# Patient Record
Sex: FEMALE | Race: WHITE | Employment: FULL TIME | URBAN - METROPOLITAN AREA
[De-identification: names, ages, dates, MRNs, and addresses within clinical notes are randomized per-mention and may not be internally consistent; named-entity substitution may affect disease eponyms.]

---

## 2017-04-18 ENCOUNTER — TELEPHONE (OUTPATIENT)
Dept: ENDOCRINOLOGY | Age: 32
End: 2017-04-18

## 2017-04-18 NOTE — TELEPHONE ENCOUNTER
Charmayne Barnes, PA at [facility], returned your call regarding this New Patient who is scheduled to see Dr. Lieutenant Kinney on May 3rd. Ms. John Mccain can be reached at:  [phone].

## 2017-04-18 NOTE — TELEPHONE ENCOUNTER
Spoke with Deya Abraham at [facility]. She wanted to know if the pt should be started on any thyroid replacement medication based on her labs. Pt is a 34 y/o female who presented with issues of fatigue. She has had multiple TSH levels in the 4-5's range and a TPO that was 386. I recommended she be started on LT4 50mcg daily. When asked about adding on  A \"free cortisol\" to her previously drawn labs I told her that a random cortisol does not have any clinical value, so don't.     Will forward this note to Dr. Riya Villalta who is scheduled to see her on 5/3/17

## 2017-04-21 ENCOUNTER — TELEPHONE (OUTPATIENT)
Dept: SLEEP MEDICINE | Age: 32
End: 2017-04-21

## 2017-04-21 NOTE — TELEPHONE ENCOUNTER
Called to schedule NP consult per Dr. Clara Orlando and patient stated she would call back to schedule if interested.

## 2017-05-02 LAB
DEPRECATED FTI SERPL-MCNC: 2.2 UG/DL
T3 UPTAKE,T3U: 21
THYROXINE (T-4), SERUM, 500455: 10.7
TSH SERPL DL<=0.005 MIU/L-ACNC: 4.72 M[IU]/L

## 2017-05-02 RX ORDER — LEVONORGESTREL AND ETHINYL ESTRADIOL 0.15-0.03
KIT ORAL
COMMUNITY

## 2017-05-02 RX ORDER — ERGOCALCIFEROL 1.25 MG/1
50000 CAPSULE ORAL
COMMUNITY

## 2017-05-03 ENCOUNTER — OFFICE VISIT (OUTPATIENT)
Dept: ENDOCRINOLOGY | Age: 32
End: 2017-05-03

## 2017-05-03 VITALS
HEART RATE: 78 BPM | BODY MASS INDEX: 40.31 KG/M2 | HEIGHT: 61 IN | DIASTOLIC BLOOD PRESSURE: 86 MMHG | WEIGHT: 213.5 LBS | SYSTOLIC BLOOD PRESSURE: 117 MMHG

## 2017-05-03 DIAGNOSIS — E03.8 HYPOTHYROIDISM DUE TO HASHIMOTO'S THYROIDITIS: Primary | ICD-10-CM

## 2017-05-03 DIAGNOSIS — E06.3 HYPOTHYROIDISM DUE TO HASHIMOTO'S THYROIDITIS: Primary | ICD-10-CM

## 2017-05-03 RX ORDER — LEVOTHYROXINE SODIUM 75 UG/1
75 TABLET ORAL
Qty: 70 TAB | Refills: 0 | Status: SHIPPED | OUTPATIENT
Start: 2017-05-03 | End: 2017-06-05 | Stop reason: SDUPTHER

## 2017-05-03 RX ORDER — LEVOTHYROXINE SODIUM 50 UG/1
TABLET ORAL
Refills: 2 | COMMUNITY
Start: 2017-04-18 | End: 2017-05-03 | Stop reason: SDUPTHER

## 2017-05-03 NOTE — PROGRESS NOTES
CONSULTATION REQUESTED BY: Lisa LUTZ    REASON FOR CONSULT: Hypothyroidism    CHIEF COMPLAINT: Evaluation for hypothyroidism    HISTORY OF PRESENT ILLNESS:   Manny Nugent is a 33 y.o. female with a PMHx as noted below who was referred to our endocrinology clinic for evaluation of hypothyroidism. Thyroid History:  Diagnosed in 2016 with borderline TSH and low T3 levels. Patient denies history of radiation exposure or trauma/surgery. Family history is not significant for thryoid disease  Currently taking levothyroxine 50mcg started by Dr. Eugene Brooks prior to initial appt by phone. The patient admits to taking it 1 hour before breakfast on an empty stomach. Recent Labs:  Lab Results   Component Value Date    TSH 4.720 2017   TPO level elevated at 386    Patient notes she was feeling quite a bit of fatigue. Focusing and concentrating has also been an issue. She notes that since starting generic levothyroxine she has been feeling raspy throat and needing ice at times. Overall, the patient is feeling well and reports her symptoms are half improved thus far.      PAST MEDICAL/SURGICAL HISTORY:   Past Medical History:   Diagnosis Date    Hypothyroidism     Mixed dyslipidemia     Other ill-defined conditions     anemia     Other ill-defined conditions     cyst on ovaries     Psychiatric disorder     depression    Psychiatric disorder     suicidal     Vitamin D deficiency      Past Surgical History:   Procedure Laterality Date    HX GYN          ALLERGIES:   Allergies   Allergen Reactions    Sulfa (Sulfonamide Antibiotics) Hives       MEDICATIONS ON ADMISSION:     Current Outpatient Prescriptions:     levothyroxine (SYNTHROID) 50 mcg tablet, TK 1 T PO D, Disp: , Rfl: 2    ergocalciferol (ERGOCALCIFEROL) 50,000 unit capsule, Take 50,000 Units by mouth., Disp: , Rfl:     levonorgestrel-ethinyl estradiol (LEVORA 0.15,) 0.15-0.03 mg tab, Take  by mouth., Disp: , Rfl:   VALERIAN ROOT PO, Take  by mouth., Disp: , Rfl:     naproxen (NAPROSYN) 500 mg tablet, Take 1 tablet by mouth every twelve (12) hours as needed for Pain., Disp: 20 tablet, Rfl: 0    HYDROcodone-acetaminophen (NORCO) 5-325 mg per tablet, Take 1 tablet by mouth every six (6) hours as needed for Pain., Disp: 12 tablet, Rfl: 0    sertraline (ZOLOFT) 50 mg tablet, Take 3 Tabs by mouth daily. , Disp: 42 Tab, Rfl: 1    SOCIAL HISTORY:   Social History     Social History    Marital status:      Spouse name: N/A    Number of children: N/A    Years of education: N/A     Occupational History    Not on file. Social History Main Topics    Smoking status: Former Smoker     Quit date: 12/27/2003    Smokeless tobacco: Not on file    Alcohol use No    Drug use: No    Sexual activity: Not on file     Other Topics Concern    Not on file     Social History Narrative       FAMILY HISTORY:  Family History   Problem Relation Age of Onset    Alcohol abuse Mother     Elevated Lipids Mother     Hypertension Mother     Arthritis-osteo Father     Lung Disease Maternal Grandmother        REVIEW OF SYSTEMS: Complete ROS assessed and noted for that which is described above, all else are negative.   Eyes: normal  ENT: normal  CVS: normal  Resp: normal  GI: normal  : normal  GYN: normal  Endocrine: mild fatigue  Integument: normal  Musculoskeletal: normal  Neuro: normal  Psych: normal      PHYSICAL EXAMINATION:    VITAL SIGNS:  Visit Vitals    /86 (BP 1 Location: Left arm, BP Patient Position: Sitting)    Pulse 78    Ht 5' 1\" (1.549 m)    Wt 213 lb 8 oz (96.8 kg)    BMI 40.34 kg/m2       GENERAL: NCAT, Sitting comfortably, NAD  EYES: EOMI, non-icteric, no proptosis  Ear/Nose/Throat: NCAT, no inflammation, no masses, thyroid gland is not appreciably enlarged  LYMPH NODES: No LAD  CARDIOVASCULAR: S1 S2, RRR, No murmur, 2+ radial pulses  RESPIRATORY: CTA b/l, no wheeze/rales  GASTROINTESTINAL: NT, ND  MUSCULOSKELETAL: Normal ROM, no atrophy  SKIN: warm, no edema/rash/ or other skin changes  NEUROLOGIC: 5/5 power all extremities, no tremor, AAOx3  PSYCHIATRIC: Normal affect, Normal insight and judgement       REVIEW OF LABORATORY AND RADIOLOGY DATA:   Labs and documentation have been reviewed as described above. ASSESSMENT AND PLAN:   Elsa Feng is a 33 y.o. female with a PMHx as noted above who was referred to our endocrinology clinic for evaluation of hypothyroidism. 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 We also discussed their current condition in the setting of this physiologic response. Today we will check a TSH & FT4 level to determine if patient is euthyroid on current regimen. Increase dose to 75 mcg of levothyroxine daily. Patient advised to take levothyroxine with a glass of water on an empty stomach each day in the mornings, 1 hour prior to ingesting any food or other medications, including vitamins. Discussed that if she misses a dose one day, to take two the following day, then return to once daily therafter     Plan to RTC in 2 months with meagan Hollingsworth.  [address] [facility]

## 2017-05-03 NOTE — PATIENT INSTRUCTIONS
Increase levothyroxine to 75 mcg daily,  Take levothyroxine with a glass of water on an empty stomach each day in the mornings, 1 hour prior to ingesting any food or other medications, including vitamins. Labs 2 days before next visit in 2 months,    Chikis BILL  [address] [facility] Endocr[facility]ology  [address] [place] [address]

## 2017-05-03 NOTE — MR AVS SNAPSHOT
Visit Information Date & Time Provider Department Dept. Phone Encounter #  
 5/3/2017  8:30 AM Rohith Valenzuela, [address] [place] [address] Diabetes and Endocrinology [phone] [id number] Follow-up Instructions Return in about 2 months (around 7/3/2017). Upcoming Health Maintenance Date Due DTaP/Tdap/Td series (1 - Tdap) 5/26/2004 PAP AKA CERVICAL CYTOLOGY 5/26/2004 INFLUENZA AGE 9 TO ADULT 8/1/2017 Allergies as of 5/3/2017  Review Complete On: 5/3/2017 By: Rohith Valenzuela MD  
  
 Severity Noted Reaction Type Reactions Sulfa (Sulfonamide Antibiotics)  12/27/2013    Hives Current Immunizations  Never Reviewed No immunizations on file. Not reviewed this visit You Were Diagnosed With   
  
 Codes Comments Hypothyroidism due to Hashimoto's thyroiditis    -  Primary ICD-10-CM: E03.8, E06.3 ICD-9-CM: 244.8, 245.2 Vitals BP Pulse Height(growth percentile) Weight(growth percentile) BMI OB Status 117/86 (BP 1 Location: Left arm, BP Patient Position: Sitting) 78 5' 1\" (1.549 m) 213 lb 8 oz (96.8 kg) 40.34 kg/m2 Having regular periods Smoking Status Former Smoker BMI and BSA Data Body Mass Index Body Surface Area  
 40.34 kg/m 2 2.04 m 2 Preferred Pharmacy Pharmacy Name Phone [facility] [address] [facility], [address] [place] [address] AT [address] [place] [address] [phone] Your Updated Medication List  
  
   
This list is accurate as of: 5/3/17  9:20 AM.  Always use your most recent med list.  
  
  
  
  
 ergocalciferol 50,000 unit capsule Commonly known as:  ERGOCALCIFEROL Take 50,000 Units by mouth. HYDROcodone-acetaminophen 5-325 mg per tablet Commonly known as:  Milinda Copier Take 1 tablet by mouth every six (6) hours as needed for Pain. LEVORA 0.15/30 (28) 0.15-0.03 mg Tab Generic drug:  levonorgestrel-ethinyl estradiol Take  by mouth. levothyroxine 75 mcg tablet Commonly known as:  SYNTHROID Take 1 Tab by mouth Daily (before breakfast). naproxen 500 mg tablet Commonly known as:  NAPROSYN Take 1 tablet by mouth every twelve (12) hours as needed for Pain. sertraline 50 mg tablet Commonly known as:  ZOLOFT Take 3 Tabs by mouth daily. VALERIAN ROOT PO Take  by mouth. Prescriptions Sent to Pharmacy Refills  
 levothyroxine (SYNTHROID) 75 mcg tablet 0 Sig: Take 1 Tab by mouth Daily (before breakfast). Class: Normal  
 Pharmacy: [facility] [address] [place] [address], [address] John Boggs  #: [phone] Route: Oral  
  
Follow-up Instructions Return in about 2 months (around 7/3/2017). To-Do List   
 06/26/2017 Lab:  T4, FREE   
  
 06/26/2017 Lab:  TSH 3RD GENERATION Patient Instructions Increase levothyroxine to 75 mcg daily, Take levothyroxine with a glass of water on an empty stomach each day in the mornings, 1 hour prior to ingesting any food or other medications, including vitamins. Labs 2 days before next visit in 2 months, 
 
Donell BILL [address] [facility] Endocr[facility]ology [facility] Introducing [facility]! Mary Kate Floyd introduces Quu patient portal. Now you can access parts of your medical record, email your doctor's office, and request medication refills online. 1. In your internet browser, go to https://[employer]) 2. Click on the First Time User? Click Here link in the Sign In box. You will see the New Member Sign Up page. 3. Enter your [employer] Access Code exactly as it appears below. You will not need to use this code after youve completed the sign-up process. If you do not sign up before the expiration date, you must request a new code. · [employer] Access Code: [email or web address] Expires: 8/1/2017  9:17 AM 
 
 4. Enter the last four digits of your Social Security Number (xxxx) and Date of Birth (mm/dd/yyyy) as indicated and click Submit. You will be taken to the next sign-up page. 5. Create a [employer] ID. This will be your [employer] login ID and cannot be changed, so think of one that is secure and easy to remember. 6. Create a [employer] password. You can change your password at any time. 7. Enter your Password Reset Question and Answer. This can be used at a later time if you forget your password. 8. Enter your e-mail address. You will receive e-mail notification when new information is available in [address] 9. Click Sign Up. You can now view and download portions of your medical record. 10. Click the Download Summary menu link to download a portable copy of your medical information. If you have questions, please visit the Frequently Asked Questions section of the [employer] website. Remember, [employer] is NOT to be used for urgent needs. For medical emergencies, dial 911. Now available from your iPhone and Android! Please provide this summary of care documentation to your next provider. Your primary care clinician is listed as [address]. If you have any questions after today's visit, please call [phone].

## 2017-06-05 RX ORDER — LEVOTHYROXINE SODIUM 75 UG/1
75 TABLET ORAL
Qty: 30 TAB | Refills: 0 | Status: SHIPPED | OUTPATIENT
Start: 2017-06-05 | End: 2017-07-19 | Stop reason: SDUPTHER

## 2017-06-12 ENCOUNTER — OFFICE VISIT (OUTPATIENT)
Dept: SURGERY | Age: 32
End: 2017-06-12

## 2017-06-12 ENCOUNTER — TELEPHONE (OUTPATIENT)
Dept: SURGERY | Age: 32
End: 2017-06-12

## 2017-06-12 VITALS
BODY MASS INDEX: 40.57 KG/M2 | OXYGEN SATURATION: 100 % | HEIGHT: 61 IN | DIASTOLIC BLOOD PRESSURE: 64 MMHG | SYSTOLIC BLOOD PRESSURE: 114 MMHG | HEART RATE: 87 BPM | WEIGHT: 214.9 LBS

## 2017-06-12 DIAGNOSIS — L02.91 ABSCESS: Primary | ICD-10-CM

## 2017-06-12 RX ORDER — LIDOCAINE HYDROCHLORIDE 10 MG/ML
10 INJECTION, SOLUTION EPIDURAL; INFILTRATION; INTRACAUDAL; PERINEURAL ONCE
Qty: 10 ML | Refills: 0
Start: 2017-06-12 | End: 2017-06-12

## 2017-06-12 RX ORDER — AMOXICILLIN 875 MG/1
TABLET, FILM COATED ORAL
COMMUNITY
Start: 2017-05-28 | End: 2017-06-26

## 2017-06-12 RX ORDER — DOXYCYCLINE 100 MG/1
100 CAPSULE ORAL 2 TIMES DAILY
Qty: 14 CAP | Refills: 0 | Status: SHIPPED | OUTPATIENT
Start: 2017-06-12 | End: 2017-06-12 | Stop reason: SINTOL

## 2017-06-12 RX ORDER — HYDROCODONE BITARTRATE AND ACETAMINOPHEN 5; 325 MG/1; MG/1
1 TABLET ORAL
Qty: 20 TAB | Refills: 0 | Status: SHIPPED | OUTPATIENT
Start: 2017-06-12 | End: 2017-06-26

## 2017-06-12 RX ORDER — TETRACYCLINE HYDROCHLORIDE 500 MG/1
500 CAPSULE ORAL
Qty: 14 CAP | Refills: 0 | Status: SHIPPED | OUTPATIENT
Start: 2017-06-12 | End: 2017-06-19

## 2017-06-12 RX ORDER — TETRACYCLINE HYDROCHLORIDE 500 MG/1
CAPSULE ORAL
COMMUNITY
Start: 2017-06-07 | End: 2017-06-12 | Stop reason: SDUPTHER

## 2017-06-12 NOTE — TELEPHONE ENCOUNTER
Patient states the antibiotic prescribed for her is the only one that she is allergic to. She would like for you to call in another one, and please call to let her know when you have done so.

## 2017-06-12 NOTE — PROGRESS NOTES
HISTORY OF PRESENT ILLNESS  Radha Bruce is a 27 y.o. female. HPI Comments:   Right axilla    Was on amox  Now on tetracycline    No drainage  Still painful        ____________________________________________________________________________  Patient presents with:  Skin Problem: Blessing in Rt armpit. Pt seen at the request of Dr. Kj Benton for evaluation of blessing    /64 (BP 1 Location: Left arm, BP Patient Position: Sitting)  Pulse 87  Ht 5' 1\" (1.549 m)  Wt 97.5 kg (214 lb 14.4 oz)  SpO2 100%  BMI 40.6 kg/m2  Past Medical History:  No date: High cholesterol  No date: Hypothyroidism  No date: Mixed dyslipidemia  No date: Other ill-defined conditions      Comment: anemia   No date: Other ill-defined conditions      Comment: cyst on ovaries   No date: Psychiatric disorder      Comment: depression  No date: Psychiatric disorder      Comment: suicidal   No date: Vitamin D deficiency  Past Surgical History:   : HX GYN  Social History    Marital status:              Spouse name:                       Years of education:                 Number of children:               Social History Main Topics    Smoking status: Former Smoker                                                                Packs/day: 0.00      Years: 0.00           Quit date: 2003    Alcohol use: No              Drug use: No              Review of patient's family history indicates:    Alcohol abuse                  Mother                    Elevated Lipids                Mother                    Hypertension                   Mother                    Arthritis-osteo                Father                    Lung Disease                   Maternal Grandmother      Current Outpatient Prescriptions:  levothyroxine (SYNTHROID) 75 mcg tablet, Take 1 Tab by mouth Daily (before breakfast).   ergocalciferol (ERGOCALCIFEROL) 50,000 unit capsule, Take 50,000 Units by mouth.  levonorgestrel-ethinyl estradiol (LEVORA 0.15/30, 28,) 0.15-0.03 mg tab, Take  by mouth. VALERIAN ROOT PO, Take  by mouth. sertraline (ZOLOFT) 50 mg tablet, Take 3 Tabs by mouth daily. amoxicillin (AMOXIL) 875 mg tablet,   tetracycline (ACHROMYCIN, SUMYCIN) 500 mg capsule,   naproxen (NAPROSYN) 500 mg tablet, Take 1 tablet by mouth every twelve (12) hours as needed for Pain. HYDROcodone-acetaminophen (NORCO) 5-325 mg per tablet, Take 1 tablet by mouth every six (6) hours as needed for Pain. No current facility-administered medications for this visit. Allergies:  -- Sulfa (Sulfonamide Antibiotics) -- Hives  _____________________________________________________________________________      Skin Problem   The history is provided by the patient. This is a recurrent problem. The current episode started more than 1 week ago. The problem occurs constantly. The problem has been gradually improving. Pertinent negatives include no chest pain, no abdominal pain, no headaches and no shortness of breath. The symptoms are aggravated by bending and twisting. The symptoms are relieved by rest. The treatment provided mild relief. Review of Systems   Constitutional: Negative for chills, fever and weight loss. HENT: Negative for ear pain. Eyes: Negative for pain. Respiratory: Negative for shortness of breath. Cardiovascular: Negative for chest pain. Gastrointestinal: Negative for abdominal pain and blood in stool. Genitourinary: Negative for hematuria. Musculoskeletal: Negative for joint pain. Skin: Negative for rash. Neurological: Negative for dizziness, focal weakness, seizures and headaches. Endo/Heme/Allergies: Does not bruise/bleed easily. Psychiatric/Behavioral: The patient does not have insomnia. Physical Exam   Constitutional: She is oriented to person, place, and time. She appears well-developed and well-nourished. No distress. HENT:   Head: Normocephalic and atraumatic. Mouth/Throat: No oropharyngeal exudate.    Eyes: Pupils are equal, round, and reactive to light. Neck: Normal range of motion. No tracheal deviation present. Cardiovascular: Normal rate, regular rhythm and normal heart sounds. No murmur heard. Pulmonary/Chest: Effort normal and breath sounds normal. No respiratory distress. She has no wheezes. Abdominal: Soft. Bowel sounds are normal. She exhibits no distension and no mass. There is no tenderness. There is no rebound and no guarding. Musculoskeletal: Normal range of motion. She exhibits no edema or tenderness. Lymphadenopathy:     She has no cervical adenopathy. Neurological: She is alert and oriented to person, place, and time. Skin: Skin is warm. No rash noted. She is not diaphoretic. No erythema. Psychiatric: She has a normal mood and affect. Her behavior is normal.       ASSESSMENT and PLAN    ICD-10-CM ICD-9-CM    1. Abscess L02.91 682.9        I had an extensive discussion with Harvey Cortez regarding the risks, benefits, and alternatives of proceeding with a incision and drainage of this axillary abscess. Risks of surgery including the risk of anesthesia, bleeding, infection, injury to underlying structures, recurrence, need for repeat or more extensive procedures, and the lack of symptomatic improvement were discussed and she is in agreement to proceed. Rx management:  Orders Placed This Encounter            doxycycline (MONODOX) 100 mg capsule     Sig: Take 1 Cap by mouth two (2) times a day for 7 days. Dispense:  14 Cap     Refill:  0    HYDROcodone-acetaminophen (NORCO) 5-325 mg per tablet     Sig: Take 1 Tab by mouth every four (4) hours as needed for Pain. Max Daily Amount: 6 Tabs. Dispense:  20 Tab     Refill:  0       She will complete her course of antibiotics. Wound care instructions were reviewed and provided in writing. F/u two weeks. Thank you for this consult. Procedure:   Incision and drainage of complex abscess of the right axilla      Procedure Details: The risks, benefits, and alternatives were explained and consent was obtained for the procedure. The area was sterile prepped and draped in the usual manner. 1% lidocaine with epinephrine was infiltrated into the skin overlying the abscess. An incision was made. A Large amount of pus was obtained. A culture was obtained. The loculations and crypts within the wound were broken up with a hemostat. The wound was packed with iodoform gauze. A sterile dressing was then applied. The patient tolerated the procedure well. Wound care instructions were given.

## 2017-06-12 NOTE — PROGRESS NOTES
[facility]  OFFICE PROCEDURE PROGRESS NOTE        Chart reviewed for the following:   ISindy LPN, have reviewed the History, Physical and updated the Allergic reactions for [address] performed immediately prior to start of procedure:   Vineet Byrd LPN, have performed the following reviews on Sukhdeep Hernandez prior to the start of the procedure:            * Patient was identified by name and date of birth   * Agreement on procedure being performed was verified  * Risks and Benefits explained to the patient  * Procedure site verified and marked as necessary  * Patient was positioned for comfort  * Consent was signed and verified     Time: 4112      Date of procedure: 6/12/2017    Procedure performed by:  Sree Morel MD    Provider assisted by: CAMMIE Love LPN    Patient assisted by: self    How tolerated by patient: Pt tolerated procedure well.     Post Procedural Pain Scale: 0/10    Comments: None

## 2017-06-12 NOTE — TELEPHONE ENCOUNTER
She told me she is allergic to Sulfa drugs. Calling to say she is also allergic to sulfites. Will cont the tetracycline for now pending the culture.

## 2017-06-12 NOTE — PATIENT INSTRUCTIONS
**Wound Care:    · Replace outer gauze with new dry gauze twice a day starting today. · Starting Tomorrow, start pulling out the packing 1\" a day:  · Pull the packing 1\" and trim the excess, leave a long tail so you don't loose the packing in the wound.   · Continue to change the outer gauze twice a day  · Pull the packing daily until the packing is completely out

## 2017-06-12 NOTE — MR AVS SNAPSHOT
Visit Information Date & Time Provider Department Dept. Phone Encounter #  
 6/12/2017  2:10 PM Ford Vann MD [facility] [place] [address] [id number] Your Appointments 6/26/2017  3:00 PM  
POST OP 10 MIN with Ford Vann MD  
[facility] Dr. Ravinder Nieves ([facility]) Appt Note: post/op I&D of right axilla abscess on 6/12/17. 2 week FU  
 [address] [facility], [address] [facility], [address] [address]  
[address] [facility], [address] [address]  
  
    
 7/19/2017  9:30 AM  
Follow Up with Viola Durand MD  
[place] [facility]) Appt Note: 2 month f/u  
 [address] [phone] [address] [facility] Upcoming Health Maintenance Date Due DTaP/Tdap/Td series (1 - Tdap) 5/26/2004 PAP AKA CERVICAL CYTOLOGY 5/26/2004 INFLUENZA AGE 9 TO ADULT 8/1/2017 Allergies as of 6/12/2017  Review Complete On: 6/12/2017 By: Ford Vann MD  
  
 Severity Noted Reaction Type Reactions Sulfa (Sulfonamide Antibiotics)  12/27/2013    Hives Current Immunizations  Never Reviewed No immunizations on file. Not reviewed this visit You Were Diagnosed With   
  
 Codes Comments Abscess    -  Primary ICD-10-CM: L02.91 
ICD-9-CM: 192. 9 Vitals BP Pulse Height(growth percentile) Weight(growth percentile) SpO2 BMI  
 114/64 (BP 1 Location: Left arm, BP Patient Position: Sitting) 87 5' 1\" (1.549 m) 214 lb 14.4 oz (97.5 kg) 100% 40.6 kg/m2 OB Status Smoking Status Having regular periods Former Smoker Vitals History BMI and BSA Data Body Mass Index Body Surface Area  
 40.6 kg/m 2 2.05 m 2 Preferred Pharmacy Pharmacy Name Phone [facility] [address] [facility], [address]  [facility], [address] [phone] Your Updated Medication List  
  
   
This list is accurate as of: 6/12/17  4:04 PM.  Always use your most recent med list.  
  
  
  
  
 amoxicillin 875 mg tablet Commonly known as:  AMOXIL  
  
 doxycycline 100 mg capsule Commonly known as:  Justina Riles Take 1 Cap by mouth two (2) times a day for 7 days. ergocalciferol 50,000 unit capsule Commonly known as:  ERGOCALCIFEROL Take 50,000 Units by mouth. HYDROcodone-acetaminophen 5-325 mg per tablet Commonly known as:  [facility] Take 1 Tab by mouth every four (4) hours as needed for Pain. Max Daily Amount: 6 Tabs. LEVORA 0.15/30 (28) 0.15-0.03 mg Tab Generic drug:  levonorgestrel-ethinyl estradiol Take  by mouth.  
  
 levothyroxine 75 mcg tablet Commonly known as:  SYNTHROID Take 1 Tab by mouth Daily (before breakfast). lidocaine (PF) 10 mg/mL (1 %) injection Commonly known as:  XYLOCAINE 10 mL by IntraVENous route once for 1 dose. Indications: lot # -DK. Exp date 11/1/18  
  
 naproxen 500 mg tablet Commonly known as:  NAPROSYN Take 1 tablet by mouth every twelve (12) hours as needed for Pain. sertraline 50 mg tablet Commonly known as:  ZOLOFT Take 3 Tabs by mouth daily. tetracycline 500 mg capsule Commonly known as:  ACHROMYCIN, SUMYCIN  
  
 VALERIAN ROOT PO Take  by mouth. Prescriptions Printed Refills HYDROcodone-acetaminophen (NORCO) 5-325 mg per tablet 0 Sig: Take 1 Tab by mouth every four (4) hours as needed for Pain. Max Daily Amount: 6 Tabs. Class: Print Route: Oral  
  
Prescriptions Sent to Pharmacy Refills  
 doxycycline (MONODOX) 100 mg capsule 0 Sig: Take 1 Cap by mouth two (2) times a day for 7 days.   
 Class: Normal  
 Pharmacy: [facility] [id number] [facility] [address], [address] [place] [address] 24 [facility] of Jonh Mahajan Ph #: [phone] Route: Oral  
  
We Performed the Following AEROBIC BACTERIAL CULTURE T4023214 CPT(R)] Patient Instructions **Wound Care: · Replace outer gauze with new dry gauze twice a day starting today. · Starting Tomorrow, start pulling out the packing 1\" a day: 
· Pull the packing 1\" and trim the excess, leave a long tail so you don't loose the packing in the wound. · Continue to change the outer gauze twice a day · Pull the packing daily until the packing is completely out Introducing [facility] SERVICES! Dear Dolly Pal: Thank you for requesting a Crowdsourcing.[email or web address] account. Our records indicate that you already have an active [email or web address] account. You can access your account anytime at [email or web address]. [email or web address] [email or web address] Did you know that you can access your hospital and ER discharge instructions at any time in [email or web address]? You can also review all of your test results from your hospital stay or ER visit. Additional Information If you have questions, please visit the Frequently Asked Questions section of the [email or web address] website at [email or web address] Remember, [email or web address] is NOT to be used for urgent needs. For medical emergencies, dial 911. Now available from your iPhone and Android! Please provide this summary of care documentation to your next provider. Your primary care clinician is listed as Keyana Casper. If you have any questions after today's visit, please call [phone].

## 2017-06-16 ENCOUNTER — TELEPHONE (OUTPATIENT)
Dept: SURGERY | Age: 32
End: 2017-06-16

## 2017-06-16 NOTE — TELEPHONE ENCOUNTER
Pt had surg Mon on a abcesspacking has come out and [employer] has come out. Please call Pt on [phone].

## 2017-06-16 NOTE — TELEPHONE ENCOUNTER
Packing out,right axilla  wound draining small amount pinkish drainage, not inflamed around site. Pt afebrile. C/o metalic taste in her mouth. Informed pt antibiotics can cause  a bad taste;no c/o rash, swelling. Drink plenty of liquids, complete all antibiotics. Keep FU appointment. If there are any new symptoms call the office back immediately. All questions answered with clarification.

## 2017-06-19 LAB
BACTERIA SPEC AEROBE CULT: ABNORMAL
BACTERIA SPEC AEROBE CULT: ABNORMAL

## 2017-06-26 ENCOUNTER — OFFICE VISIT (OUTPATIENT)
Dept: SURGERY | Age: 32
End: 2017-06-26

## 2017-06-26 VITALS
BODY MASS INDEX: 40.69 KG/M2 | WEIGHT: 215.5 LBS | DIASTOLIC BLOOD PRESSURE: 72 MMHG | HEIGHT: 61 IN | HEART RATE: 86 BPM | SYSTOLIC BLOOD PRESSURE: 95 MMHG | OXYGEN SATURATION: 97 %

## 2017-06-26 DIAGNOSIS — E06.3 HYPOTHYROIDISM DUE TO HASHIMOTO'S THYROIDITIS: ICD-10-CM

## 2017-06-26 DIAGNOSIS — L73.2 HIDRADENITIS: Primary | ICD-10-CM

## 2017-06-26 DIAGNOSIS — E03.8 HYPOTHYROIDISM DUE TO HASHIMOTO'S THYROIDITIS: ICD-10-CM

## 2017-06-26 NOTE — MR AVS SNAPSHOT
Visit Information Date & Time Provider Department Dept. Phone Encounter #  
 6/26/2017  3:00 PM Lacy Stokes MD [facility] [address] [phone] [id number] Your Appointments 7/19/2017  9:30 AM  
Follow Up with MD [facility]) Appt Note: 2 month f/u  
 [address] [facility] [address] [address] [facility] Upcoming Health Maintenance Date Due DTaP/Tdap/Td series (1 - Tdap) 5/26/2004 PAP AKA CERVICAL CYTOLOGY 5/26/2004 INFLUENZA AGE 9 TO ADULT 8/1/2017 Allergies as of 6/26/2017  Review Complete On: 6/26/2017 By: Lacy Stokes MD  
  
 Severity Noted Reaction Type Reactions Doxycycline  06/12/2017    Nausea and Vomiting Can take tetracycline without reaction Sulfa (Sulfonamide Antibiotics)  12/27/2013    Hives Sulfites  06/12/2017    Nausea and Vomiting Current Immunizations  Never Reviewed No immunizations on file. Not reviewed this visit You Were Diagnosed With   
  
 Codes Comments Hidradenitis    -  Primary ICD-10-CM: L73.2 ICD-9-CM: 705.83 Vitals BP Pulse Height(growth percentile) Weight(growth percentile) SpO2 BMI  
 95/72 (BP 1 Location: Right arm, BP Patient Position: Sitting) 86 5' 1\" (1.549 m) 215 lb 8 oz (97.8 kg) 97% 40.72 kg/m2 OB Status Smoking Status Having regular periods Former Smoker BMI and BSA Data Body Mass Index Body Surface Area 40.72 kg/m 2 2.05 m 2 Preferred Pharmacy Pharmacy Name Phone [facility] [address] [facility], [address] AT [address] [place] [address] [phone] Your Updated Medication List  
  
   
This list is accurate as of: 6/26/17 11:59 PM.  Always use your most recent med list.  
  
  
  
  
 ergocalciferol 50,000 unit capsule Commonly known as:  ERGOCALCIFEROL Take 50,000 Units by mouth. LEVORA 0.15/30 (28) 0.15-0.03 mg Tab Generic drug:  levonorgestrel-ethinyl estradiol Take  by mouth.  
  
 levothyroxine 75 mcg tablet Commonly known as:  SYNTHROID Take 1 Tab by mouth Daily (before breakfast). VALERIAN ROOT PO Take  by mouth. Introducing [facility] & HEALTH SERVICES! Dear Fernanda: Thank you for requesting a ID Quantique account. Our records indicate that you already have an active ID Quantique account. You can access your account anytime at [email or web address]. iRhythm Technologies/Fusion Garage Did you know that you can access your hospital and ER discharge instructions at any time in ID Quantique? You can also review all of your test results from your hospital stay or ER visit. Additional Information If you have questions, please visit the Frequently Asked Questions section of the [employer] website at [email or web address] iRhythm [email or web address]/[email or web address] [email or web address]/. Remember, ID Quantique is NOT to be used for urgent needs. For medical emergencies, dial 911. Now available from your iPhone and Android! Please provide this summary of care documentation to your next provider. Your primary care clinician is listed as Keyana Casper. If you have any questions after today's visit, please call [phone].

## 2017-06-26 NOTE — PROGRESS NOTES
Right axilla abscess I&D  Healed  Non-tender  Cx: +staph  Completed course of Tetracy        I had an extensive and thorough discussion with Bianca Apollo regarding the nature of hidradenitis. she understands this is a chronic disease without a cure. We reviewed all modalities of treatment includin. Weight loss helps to reduce severity of the disease. 2   Large cysts should be incised and drained, she is currently free of any lesions that require drainage @ this time. 3.  We discussed antibiotics are the mainstay of treatment, especially for the early stages of the disease. Long-term oral antibiotics such as tetracycline (500 mg twice daily), erythromycin (500 mg twice daily), doxycycline (100 mg twice daily), or minocycline (100 mg twice daily) may prevent disease activation. High dosages are effective for active disease. Lower doses may be effective for maintenance once control is established. Social History   Substance Use Topics    Smoking status: Former Smoker     Quit date: 2003    Smokeless tobacco: Never Used    Alcohol use No        Discussed hair removal, no antiperspirants    F/U PRN      Total face-to-face time spent with her was 25 minutes with the majority spent with counseling and coordination of care.

## 2017-07-13 LAB
T4 FREE SERPL-MCNC: 1.3 NG/DL (ref 0.82–1.77)
TSH SERPL DL<=0.005 MIU/L-ACNC: 3.33 UIU/ML (ref 0.45–4.5)

## 2017-07-19 ENCOUNTER — OFFICE VISIT (OUTPATIENT)
Dept: ENDOCRINOLOGY | Age: 32
End: 2017-07-19

## 2017-07-19 VITALS
DIASTOLIC BLOOD PRESSURE: 73 MMHG | BODY MASS INDEX: 41.04 KG/M2 | WEIGHT: 217.4 LBS | HEIGHT: 61 IN | SYSTOLIC BLOOD PRESSURE: 107 MMHG | HEART RATE: 76 BPM

## 2017-07-19 DIAGNOSIS — E03.8 HYPOTHYROIDISM DUE TO HASHIMOTO'S THYROIDITIS: Primary | ICD-10-CM

## 2017-07-19 DIAGNOSIS — E06.3 HYPOTHYROIDISM DUE TO HASHIMOTO'S THYROIDITIS: Primary | ICD-10-CM

## 2017-07-19 RX ORDER — LEVOTHYROXINE SODIUM 112 UG/1
112 TABLET ORAL
Qty: 90 TAB | Refills: 3 | Status: SHIPPED | OUTPATIENT
Start: 2017-07-19

## 2017-07-19 RX ORDER — CETIRIZINE HCL 10 MG
TABLET ORAL
COMMUNITY

## 2017-07-19 NOTE — PROGRESS NOTES
CHIEF COMPLAINT: f/u evaluation for hypothyroidism. HISTORY OF PRESENT ILLNESS:   Kareem Eli is a 27 y.o. female with a PMHx as noted below who presents to the endocrinology clinic for f/u evaluation of hypothyroidism. Patient came to us initially on 50 mcg, having been started for hashimotos thyroiditis. She had a positive TPO antibody. We had increased her dose on the initial visit to 75 mcg as she was very symptomatic. We had discussed the proper way to take the medicine. She has been taking properly and her thyroid function remains in the normal range but still symptomatic,   Likely needing a higher dose. She notes fatigue. Hair is improving however. No symptoms of hyperthyroidism. Review of most recent thyroid function:  Lab Results   Component Value Date    TSH 3.330 2017    TSH 4.720 2017    FT4 1.30 2017      TSILT = Thyroid stimulating antibodies  TMCLT = TPO antibodies  T3LT = Total T3 levels    PAST MEDICAL/SURGICAL HISTORY:   Past Medical History:   Diagnosis Date    High cholesterol     Hypothyroidism     Mixed dyslipidemia     Other ill-defined conditions     anemia     Other ill-defined conditions     cyst on ovaries     Psychiatric disorder     depression    Psychiatric disorder     suicidal     Vitamin D deficiency      Past Surgical History:   Procedure Laterality Date    HX GYN       HX OTHER SURGICAL  2017    I&D axilla abscess       ALLERGIES:   Allergies   Allergen Reactions    Doxycycline Nausea and Vomiting     Can take tetracycline without reaction    Sulfa (Sulfonamide Antibiotics) Hives    Sulfites Nausea and Vomiting       MEDICATIONS ON ADMISSION:     Current Outpatient Prescriptions:     cetirizine (ZYRTEC) 10 mg tablet, Take  by mouth., Disp: , Rfl:     levothyroxine (SYNTHROID) 75 mcg tablet, Take 1 Tab by mouth Daily (before breakfast). , Disp: 30 Tab, Rfl: 0    ergocalciferol (ERGOCALCIFEROL) 50,000 unit capsule, Take 50,000 Units by mouth., Disp: , Rfl:     levonorgestrel-ethinyl estradiol (LEVORA 0.15/30, 28,) 0.15-0.03 mg tab, Take  by mouth., Disp: , Rfl:     VALERIAN ROOT PO, Take  by mouth., Disp: , Rfl:     SOCIAL HISTORY:   Social History     Social History    Marital status:      Spouse name: N/A    Number of children: N/A    Years of education: N/A     Occupational History    Not on file. Social History Main Topics    Smoking status: Former Smoker     Quit date: 12/27/2003    Smokeless tobacco: Never Used    Alcohol use No    Drug use: No    Sexual activity: Not on file     Other Topics Concern    Not on file     Social History Narrative       FAMILY HISTORY:  Family History   Problem Relation Age of Onset    Alcohol abuse Mother     Elevated Lipids Mother     Hypertension Mother     Arthritis-osteo Father     Lung Disease Maternal Grandmother        REVIEW OF SYSTEMS: Complete ROS assessed and noted for that which is described above, all else are negative.   Eyes: normal  ENT: normal  CVS: normal  Resp: normal  GI: normal  : normal  GYN: normal  Endocrine: normal  Integument: normal  Musculoskeletal: normal  Neuro: normal  Psych: normal      PHYSICAL EXAMINATION:    VITAL SIGNS:  Visit Vitals    /73 (BP 1 Location: Left arm, BP Patient Position: Sitting)    Pulse 76    Ht 5' 1\" (1.549 m)    Wt 217 lb 6.4 oz (98.6 kg)    BMI 41.08 kg/m2       GENERAL: NCAT, Sitting comfortably, NAD  EYES: EOMI, non-icteric, no proptosis  Ear/Nose/Throat: NCAT, no inflammation, thyroid gland slightly enlarged  LYMPH NODES: No LAD  CARDIOVASCULAR: S1 S2, RRR, No murmur, 2+ radial pulses  RESPIRATORY: CTA b/l, no wheeze/rales  GASTROINTESTINAL:  ND  MUSCULOSKELETAL: Normal ROM, no atrophy  SKIN: warm, no edema/rash/ or other skin changes  NEUROLOGIC: 5/5 power all extremities, no tremors, AAOx3  PSYCHIATRIC: Normal affect, Normal insight and judgement         REVIEW OF LABORATORY AND RADIOLOGY DATA:   Labs and documentation have been reviewed as described above. ASSESSMENT AND PLAN:   Mynor Miranda is a 27 y.o. female with a PMHx as noted above who presents to the endocrinology clinic for the f/u evaluation of hypothyroidism. Hypothyroidism    Patient still with symptoms of fatigue and mild neck tenderness. We will increase her dose of levothyroxine to 112 mcg once daily,   We discussed again the proper way to take thyroid hormone,  Will see her back in 3 months with Kishan rhodes  [address] [facility] End[facility]ology

## 2017-07-19 NOTE — PATIENT INSTRUCTIONS
Increase levothyroxine to 112 micrograms each morning,  Remember to take levothyroxine with a glass of water on an empty stomach each day in the mornings, 1 hour prior to ingesting any food or other medications, including vitamins. Plan to return to clinic in 2 months,  You can do pre labs before that visit, 2 days before. Drenda Habermann.  [address] [facility] Endocrinology  [address] [place] [address]

## 2017-07-19 NOTE — MR AVS SNAPSHOT
Visit Information Date & Time Provider Department Dept. Phone Encounter #  
 7/19/2017  9:30 AM Addy Pereira, [address] [place] [address] Diabetes and Endocrinology [phone] [id number] Follow-up Instructions Return in about 2 months (around 9/19/2017). Upcoming Health Maintenance Date Due DTaP/Tdap/Td series (1 - Tdap) 5/26/2004 PAP AKA CERVICAL CYTOLOGY 5/26/2004 INFLUENZA AGE 9 TO ADULT 8/1/2017 Allergies as of 7/19/2017  Review Complete On: 7/19/2017 By: Addy Pereira MD  
  
 Severity Noted Reaction Type Reactions Doxycycline  06/12/2017    Nausea and Vomiting Can take tetracycline without reaction Sulfa (Sulfonamide Antibiotics)  12/27/2013    Hives Sulfites  06/12/2017    Nausea and Vomiting Current Immunizations  Never Reviewed No immunizations on file. Not reviewed this visit You Were Diagnosed With   
  
 Codes Comments Hypothyroidism due to Hashimoto's thyroiditis    -  Primary ICD-10-CM: E03.8, E06.3 ICD-9-CM: 244.8, 245.2 Vitals BP Pulse Height(growth percentile) Weight(growth percentile) BMI OB Status 107/73 (BP 1 Location: Left arm, BP Patient Position: Sitting) 76 5' 1\" (1.549 m) 217 lb 6.4 oz (98.6 kg) 41.08 kg/m2 Having regular periods Smoking Status Former Smoker BMI and BSA Data Body Mass Index Body Surface Area 41.08 kg/m 2 2.06 m 2 Preferred Pharmacy Pharmacy Name Phone [facility]ORE 30[address] [facility], [address] [place] [address] AT [address] [place] [address] [phone] Your Updated Medication List  
  
   
This list is accurate as of: 7/19/17  9:51 AM.  Always use your most recent med list.  
  
  
  
  
 ergocalciferol 50,000 unit capsule Commonly known as:  ERGOCALCIFEROL Take 50,000 Units by mouth. LEVORA 0.15/30 (28) 0.15-0.03 mg Tab Generic drug:  levonorgestrel-ethinyl estradiol Take  by mouth. levothyroxine 112 mcg tablet Commonly known as:  SYNTHROID Take 1 Tab by mouth Daily (before breakfast). VALERIAN ROOT PO Take  by mouth. ZyrTEC 10 mg tablet Generic drug:  cetirizine Take  by mouth. Prescriptions Sent to Pharmacy Refills  
 levothyroxine (SYNTHROID) 112 mcg tablet 3 Sig: Take 1 Tab by mouth Daily (before breakfast). Class: Normal  
 Pharmacy: [facility] [address] [place] [address], [address] [place] [address] Joel Orozco  #: [phone] Route: Oral  
  
Follow-up Instructions Return in about 2 months (around 9/19/2017). To-Do List   
 09/11/2017 Lab:  T4, FREE   
  
 09/11/2017 Lab:  TSH 3RD GENERATION Patient Instructions Increase levothyroxine to 112 micrograms each morning, Remember to take levothyroxine with a glass of water on an empty stomach each day in the mornings, 1 hour prior to ingesting any food or other medications, including vitamins. Plan to return to clinic in 2 months, You can do pre labs before that visit, 2 days before. Rebecca Amaya. [facility] Diabetes & Endocrinology [facility] & HEALTH SERVICES! Dear Elyse Iverson: Thank you for requesting a Hart InterCivic account. Our records indicate that you already have an active Hart InterCiv[employer] account. You can access your account anytime at [email or web address]. Ditto Labs/Volas Entertainment Did you know that you can access your hospital and ER discharge instructions at any time in Hart InterCivic? You can also review all of your test results from your hospital stay or ER visit. Additional Information If you have questions, please visit the Frequently Asked Questions section of the [employer] website at [email or web address]. Ditto Labs/Volas Entertainment/. Remember, Hart InterCivic is NOT to be used for urgent needs. For medical emergencies, dial 911. Now available from your iPhone and Android! Please provide this summary of care documentation to your next provider. Your primary care clinician is listed as 535Randy Casper. If you have any questions after today's visit, please call [phone].

## 2017-09-28 LAB
T4 FREE SERPL-MCNC: 1.46 NG/DL (ref 0.82–1.77)
TSH SERPL DL<=0.005 MIU/L-ACNC: 3.08 UIU/ML (ref 0.45–4.5)

## 2017-10-02 ENCOUNTER — OFFICE VISIT (OUTPATIENT)
Dept: ENDOCRINOLOGY | Age: 32
End: 2017-10-02

## 2017-10-02 VITALS
WEIGHT: 226.8 LBS | SYSTOLIC BLOOD PRESSURE: 133 MMHG | HEART RATE: 98 BPM | BODY MASS INDEX: 42.82 KG/M2 | DIASTOLIC BLOOD PRESSURE: 84 MMHG | HEIGHT: 61 IN

## 2017-10-02 DIAGNOSIS — R73.03 PREDIABETES: ICD-10-CM

## 2017-10-02 DIAGNOSIS — E03.8 HYPOTHYROIDISM DUE TO HASHIMOTO'S THYROIDITIS: Primary | ICD-10-CM

## 2017-10-02 DIAGNOSIS — E06.3 HYPOTHYROIDISM DUE TO HASHIMOTO'S THYROIDITIS: Primary | ICD-10-CM

## 2017-10-02 RX ORDER — CHOLECALCIFEROL TAB 125 MCG (5000 UNIT) 125 MCG
TAB ORAL DAILY
COMMUNITY

## 2017-10-02 RX ORDER — 1.1% SODIUM FLUORIDE PRESCRIPTION DENTAL CREAM 5 MG/G
CREAM DENTAL
Refills: 1 | COMMUNITY
Start: 2017-09-27

## 2017-10-02 RX ORDER — PSEUDOEPHEDRINE HCL 30 MG
TABLET ORAL
COMMUNITY

## 2017-10-02 NOTE — MR AVS SNAPSHOT
Visit Information Date & Time Provider Department Dept. Phone Encounter #  
 10/2/2017 12:10 PM Priti Nixon, [address] [place] [address] Diabetes and Endocrinology [phone] [id number] Follow-up Instructions Return in about 3 months (around 1/2/2018). Upcoming Health Maintenance Date Due DTaP/Tdap/Td series (1 - Tdap) 5/26/2004 PAP AKA CERVICAL CYTOLOGY 5/26/2004 INFLUENZA AGE 9 TO ADULT 8/1/2017 Allergies as of 10/2/2017  Review Complete On: 10/2/2017 By: Priti Nixon MD  
  
 Severity Noted Reaction Type Reactions Doxycycline  06/12/2017    Nausea and Vomiting Can take tetracycline without reaction Sulfa (Sulfonamide Antibiotics)  12/27/2013    Hives Sulfites  06/12/2017    Nausea and Vomiting Current Immunizations  Never Reviewed No immunizations on file. Not reviewed this visit You Were Diagnosed With   
  
 Codes Comments Hypothyroidism due to Hashimoto's thyroiditis    -  Primary ICD-10-CM: E03.8, E06.3 ICD-9-CM: 244.8, 245.2 Prediabetes     ICD-10-CM: R73.03 
ICD-9-CM: 790.29 Vitals BP Pulse Height(growth percentile) Weight(growth percentile) BMI OB Status 133/84 (BP 1 Location: Left arm, BP Patient Position: Sitting) 98 5' 1\" (1.549 m) 226 lb 12.8 oz (102.9 kg) 42.85 kg/m2 Having regular periods Smoking Status Former Smoker Vitals History BMI and BSA Data Body Mass Index Body Surface Area  
 42.85 kg/m 2 2.1 m 2 Preferred Pharmacy Pharmacy Name Phone [facility] [address] [facility], [address] [place] [address] AT [address] [place] [address], [facility] [phone] Your Updated Medication List  
  
   
This list is accurate as of: 10/2/17 12:45 PM.  Always use your most recent med list.  
  
  
  
  
 cholecalciferol (VITAMIN D3) 5,000 unit Tab tablet Commonly known as:  VITAMIN D3 Take  by mouth daily. ergocalciferol 50,000 unit capsule Commonly known as:  ERGOCALCIFEROL Take 50,000 Units by mouth. LEVORA 0.15/30 (28) 0.15-0.03 mg Tab Generic drug:  levonorgestrel-ethinyl estradiol Take  by mouth.  
  
 levothyroxine 112 mcg tablet Commonly known as:  SYNTHROID Take 1 Tab by mouth Daily (before breakfast). SF 5000 PLUS 1.1 % Crea Generic drug:  fluoride (sodium) BRUSH ONE TIME D AT NIGHT  
  
 SUDAFED 30 mg tablet Generic drug:  pseudoephedrine Take  by mouth every four (4) hours as needed for Congestion. VALERIAN ROOT PO Take  by mouth. ZyrTEC 10 mg tablet Generic drug:  cetirizine Take  by mouth. We Performed the Following HEMOGLOBIN A1C WITH EAG [54881 CPT(R)] LIPID PANEL [43823 CPT(R)] T4, FREE E304410 CPT(R)] TSH 3RD GENERATION [31605 CPT(R)] Follow-up Instructions Return in about 3 months (around 1/2/2018). Patient Instructions PLEASE READ THESE INSTRUCTIONS:  
 
1. Plan to take 112 mcg of levothyroxine each morning, plus extra half tablet on fridays only. 2. Remember to take levothyroxine with a glass of water on an empty stomach each day in the mornings, 1 hour prior to ingesting any food or other medications, including vitamins. 3. We will plan for you to return to clinic in 2 months for re-evaluation, 
 
4. I have provided you with a lab order sheet so you can have your labs repeated 2 days before your next visit. This way we can have the results available to us in time for your visit so we can make the best decisions at that time. Introducing [facility] & HEALTH SERVICES! Dear Tanvi Malik: Thank you for requesting a "Ada[employer] account. Our records indicate that you already have an active "[employer]." account. You can access your account anytime at [email or web address] Did you know that you can access your hospital and ER discharge instructions at any time in "[employer]."?   You can also review all of your test results from your hospital stay or ER visit. Additional Information If you have questions, please visit the Frequently Asked Questions section of the [employer] website at [email or web address] [email or web address]. Pure[employer]lection [employer] Spa [employer]. BodyMedia/mychart/. Remember, [employer] is NOT to be used for urgent needs. For medical emergencies, dial 911. Now available from your iPhone and Android! Please provide this summary of care documentation to your next provider. Your primary care clinician is listed as Phys Other. If you have any questions after today's visit, please call [phone].

## 2017-10-02 NOTE — PROGRESS NOTES
CHIEF COMPLAINT: f/u evaluation for hypothyroidism. HISTORY OF PRESENT ILLNESS:   Senthil Atkins is a 27 y.o. female with a PMHx as noted below who presents to the endocrinology clinic for f/u evaluation of hypothyroidism. Patient came to us initially on 50 mcg, having been started for hashimotos thyroiditis. She had a positive TPO antibody. We had increased her dose on the initial visit to 75 mcg then 112 mcg daily. We had discussed the proper way to take the medicine. She notes that she is feeling well today on the current dose. Her arthralgias are getting better with time, mostly her knees and ankles. Reports she had a serology panel for lupus and other autoimmune diseases which were negative. Recently she started 5000 units of D3 for a level of 19. Hair is improving, energy level is stable, wishing to run marathon in November.      Review of most recent thyroid function:  Lab Results   Component Value Date    TSH 3.080 2017    TSH 3.330 2017    TSH 4.720 2017    FT4 1.46 2017    FT4 1.30 2017      TSILT = Thyroid stimulating antibodies  TMCLT = TPO antibodies  T3LT = Total T3 levels    PAST MEDICAL/SURGICAL HISTORY:   Past Medical History:   Diagnosis Date    High cholesterol     Hypothyroidism     Mixed dyslipidemia     Other ill-defined conditions(799.89)     anemia     Other ill-defined conditions(799.89)     cyst on ovaries     Psychiatric disorder     depression    Psychiatric disorder     suicidal     Vitamin D deficiency      Past Surgical History:   Procedure Laterality Date    HX GYN       HX OTHER SURGICAL  2017    I&D axilla abscess       ALLERGIES:   Allergies   Allergen Reactions    Doxycycline Nausea and Vomiting     Can take tetracycline without reaction    Sulfa (Sulfonamide Antibiotics) Hives    Sulfites Nausea and Vomiting       MEDICATIONS ON ADMISSION:     Current Outpatient Prescriptions:     cholecalciferol, VITAMIN D3, (VITAMIN D3) 5,000 unit tab tablet, Take  by mouth daily. , Disp: , Rfl:     pseudoephedrine (SUDAFED) 30 mg tablet, Take  by mouth every four (4) hours as needed for Congestion. , Disp: , Rfl:     SF 5000 PLUS 1.1 % crea, BRUSH ONE TIME D AT NIGHT, Disp: , Rfl: 1    cetirizine (ZYRTEC) 10 mg tablet, Take  by mouth., Disp: , Rfl:     levothyroxine (SYNTHROID) 112 mcg tablet, Take 1 Tab by mouth Daily (before breakfast). , Disp: 90 Tab, Rfl: 3    levonorgestrel-ethinyl estradiol (LEVORA 0.15/30, 28,) 0.15-0.03 mg tab, Take  by mouth., Disp: , Rfl:     VALERIAN ROOT PO, Take  by mouth., Disp: , Rfl:     ergocalciferol (ERGOCALCIFEROL) 50,000 unit capsule, Take 50,000 Units by mouth., Disp: , Rfl:     SOCIAL HISTORY:   Social History     Social History    Marital status:      Spouse name: N/A    Number of children: N/A    Years of education: N/A     Occupational History    Not on file. Social History Main Topics    Smoking status: Former Smoker     Quit date: 12/27/2003    Smokeless tobacco: Never Used    Alcohol use No    Drug use: No    Sexual activity: Not on file     Other Topics Concern    Not on file     Social History Narrative       FAMILY HISTORY:  Family History   Problem Relation Age of Onset    Alcohol abuse Mother     Elevated Lipids Mother     Hypertension Mother     Arthritis-osteo Father     Lung Disease Maternal Grandmother        REVIEW OF SYSTEMS: Complete ROS assessed and noted for that which is described above, all else are negative.   Eyes: normal  ENT: normal  CVS: normal  Resp: normal  GI: normal  : normal  GYN: normal  Endocrine: normal  Integument: normal  Musculoskeletal: normal  Neuro: normal  Psych: normal      PHYSICAL EXAMINATION:    VITAL SIGNS:  Visit Vitals    /84 (BP 1 Location: Left arm, BP Patient Position: Sitting)    Pulse 98    Ht 5' 1\" (1.549 m)    Wt 226 lb 12.8 oz (102.9 kg)    BMI 42.85 kg/m2       GENERAL: NCAT, Sitting comfortably, NAD  EYES: EOMI, non-icteric, no proptosis  Ear/Nose/Throat: NCAT, no inflammation, thyroid gland slightly enlarged  LYMPH NODES: No LAD  CARDIOVASCULAR: S1 S2, RRR, No murmur, 2+ radial pulses  RESPIRATORY: CTA b/l, no wheeze/rales  GASTROINTESTINAL:  ND  MUSCULOSKELETAL: Normal ROM, no atrophy  SKIN: warm, no edema/rash/ or other skin changes  NEUROLOGIC: 5/5 power all extremities, no tremors, AAOx3  PSYCHIATRIC: Normal affect, Normal insight and judgement         REVIEW OF LABORATORY AND RADIOLOGY DATA:   Labs and documentation have been reviewed as described above. ASSESSMENT AND PLAN:   Cher Martell is a 27 y.o. female with a PMHx as noted above who presents to the endocrinology clinic for the f/u evaluation of hypothyroidism. Hypothyroidism  Prediabetes    Feeling much better on levothyroxine, though would like TSH just below 3  We will increase levothyroxine to 112 mcg once daily, + extra half tablet on Fridays  We discussed again the proper way to take thyroid hormone,  Will see her back in 3 months with prelabs,    Prediabetes/lipids: hx of elevated lipids and prediabetes status. Not on statin, notes long hx of uncontrolled lipids. Will check on f/u prelabs and start therapy if appropriate. Discussed diet and exercise. Pool activities, calorie and carb control. Rakel President.  [id number] [facility]

## 2017-10-02 NOTE — PATIENT INSTRUCTIONS
PLEASE READ THESE INSTRUCTIONS:     1. Plan to take 112 mcg of levothyroxine each morning, plus extra half tablet on fridays only. 2. Remember to take levothyroxine with a glass of water on an empty stomach each day in the mornings, 1 hour prior to ingesting any food or other medications, including vitamins. 3. We will plan for you to return to clinic in 2 months for re-evaluation,    4. I have provided you with a lab order sheet so you can have your labs repeated 2 days before your next visit. This way we can have the results available to us in time for your visit so we can make the best decisions at that time.